# Patient Record
Sex: MALE | Race: WHITE | NOT HISPANIC OR LATINO | Employment: FULL TIME | URBAN - METROPOLITAN AREA
[De-identification: names, ages, dates, MRNs, and addresses within clinical notes are randomized per-mention and may not be internally consistent; named-entity substitution may affect disease eponyms.]

---

## 2021-05-31 ENCOUNTER — RECORDS - HEALTHEAST (OUTPATIENT)
Dept: ADMINISTRATIVE | Facility: CLINIC | Age: 51
End: 2021-05-31

## 2022-06-25 ENCOUNTER — APPOINTMENT (OUTPATIENT)
Dept: CT IMAGING | Facility: HOSPITAL | Age: 52
End: 2022-06-25
Attending: EMERGENCY MEDICINE
Payer: COMMERCIAL

## 2022-06-25 ENCOUNTER — HOSPITAL ENCOUNTER (EMERGENCY)
Facility: HOSPITAL | Age: 52
Discharge: HOME | End: 2022-06-26
Attending: EMERGENCY MEDICINE | Admitting: EMERGENCY MEDICINE
Payer: COMMERCIAL

## 2022-06-25 DIAGNOSIS — N20.1 URETEROLITHIASIS: ICD-10-CM

## 2022-06-25 LAB
ANION GAP SERPL CALCULATED.3IONS-SCNC: 9 MMOL/L (ref 5–18)
BASOPHILS # BLD AUTO: 0.1 10E3/UL (ref 0–0.2)
BASOPHILS NFR BLD AUTO: 1 %
BUN SERPL-MCNC: 16 MG/DL (ref 8–22)
CALCIUM SERPL-MCNC: 8.9 MG/DL (ref 8.5–10.5)
CHLORIDE BLD-SCNC: 107 MMOL/L (ref 98–107)
CO2 SERPL-SCNC: 23 MMOL/L (ref 22–31)
CREAT SERPL-MCNC: 1.28 MG/DL (ref 0.7–1.3)
EOSINOPHIL # BLD AUTO: 0.2 10E3/UL (ref 0–0.7)
EOSINOPHIL NFR BLD AUTO: 3 %
ERYTHROCYTE [DISTWIDTH] IN BLOOD BY AUTOMATED COUNT: 12.7 % (ref 10–15)
GFR SERPL CREATININE-BSD FRML MDRD: 67 ML/MIN/1.73M2
GLUCOSE BLD-MCNC: 135 MG/DL (ref 70–125)
HCT VFR BLD AUTO: 43.1 % (ref 40–53)
HGB BLD-MCNC: 14.1 G/DL (ref 13.3–17.7)
IMM GRANULOCYTES # BLD: 0 10E3/UL
IMM GRANULOCYTES NFR BLD: 1 %
LYMPHOCYTES # BLD AUTO: 1.7 10E3/UL (ref 0.8–5.3)
LYMPHOCYTES NFR BLD AUTO: 24 %
MCH RBC QN AUTO: 29 PG (ref 26.5–33)
MCHC RBC AUTO-ENTMCNC: 32.7 G/DL (ref 31.5–36.5)
MCV RBC AUTO: 89 FL (ref 78–100)
MONOCYTES # BLD AUTO: 0.7 10E3/UL (ref 0–1.3)
MONOCYTES NFR BLD AUTO: 10 %
NEUTROPHILS # BLD AUTO: 4.4 10E3/UL (ref 1.6–8.3)
NEUTROPHILS NFR BLD AUTO: 61 %
NRBC # BLD AUTO: 0 10E3/UL
NRBC BLD AUTO-RTO: 0 /100
PLATELET # BLD AUTO: 260 10E3/UL (ref 150–450)
POTASSIUM BLD-SCNC: 3.9 MMOL/L (ref 3.5–5)
RBC # BLD AUTO: 4.86 10E6/UL (ref 4.4–5.9)
SODIUM SERPL-SCNC: 139 MMOL/L (ref 136–145)
WBC # BLD AUTO: 7 10E3/UL (ref 4–11)

## 2022-06-25 PROCEDURE — 36415 COLL VENOUS BLD VENIPUNCTURE: CPT | Performed by: EMERGENCY MEDICINE

## 2022-06-25 PROCEDURE — 250N000011 HC RX IP 250 OP 636: Performed by: EMERGENCY MEDICINE

## 2022-06-25 PROCEDURE — 96361 HYDRATE IV INFUSION ADD-ON: CPT

## 2022-06-25 PROCEDURE — 80048 BASIC METABOLIC PNL TOTAL CA: CPT | Performed by: EMERGENCY MEDICINE

## 2022-06-25 PROCEDURE — 85025 COMPLETE CBC W/AUTO DIFF WBC: CPT | Performed by: EMERGENCY MEDICINE

## 2022-06-25 PROCEDURE — 258N000003 HC RX IP 258 OP 636: Performed by: EMERGENCY MEDICINE

## 2022-06-25 PROCEDURE — 99285 EMERGENCY DEPT VISIT HI MDM: CPT | Mod: 25

## 2022-06-25 PROCEDURE — 96375 TX/PRO/DX INJ NEW DRUG ADDON: CPT

## 2022-06-25 PROCEDURE — 74176 CT ABD & PELVIS W/O CONTRAST: CPT

## 2022-06-25 PROCEDURE — 96374 THER/PROPH/DIAG INJ IV PUSH: CPT

## 2022-06-25 RX ORDER — KETOROLAC TROMETHAMINE 15 MG/ML
15 INJECTION, SOLUTION INTRAMUSCULAR; INTRAVENOUS ONCE
Status: COMPLETED | OUTPATIENT
Start: 2022-06-25 | End: 2022-06-25

## 2022-06-25 RX ORDER — ONDANSETRON 2 MG/ML
4 INJECTION INTRAMUSCULAR; INTRAVENOUS ONCE
Status: DISCONTINUED | OUTPATIENT
Start: 2022-06-25 | End: 2022-06-26 | Stop reason: HOSPADM

## 2022-06-25 RX ORDER — MORPHINE SULFATE 4 MG/ML
4 INJECTION, SOLUTION INTRAMUSCULAR; INTRAVENOUS ONCE
Status: COMPLETED | OUTPATIENT
Start: 2022-06-25 | End: 2022-06-25

## 2022-06-25 RX ADMIN — SODIUM CHLORIDE 1000 ML: 9 INJECTION, SOLUTION INTRAVENOUS at 23:30

## 2022-06-25 RX ADMIN — MORPHINE SULFATE 4 MG: 4 INJECTION INTRAVENOUS at 23:53

## 2022-06-25 RX ADMIN — KETOROLAC TROMETHAMINE 15 MG: 15 INJECTION, SOLUTION INTRAMUSCULAR; INTRAVENOUS at 23:53

## 2022-06-26 VITALS
HEART RATE: 86 BPM | DIASTOLIC BLOOD PRESSURE: 77 MMHG | SYSTOLIC BLOOD PRESSURE: 125 MMHG | WEIGHT: 175 LBS | OXYGEN SATURATION: 95 % | TEMPERATURE: 97.9 F | RESPIRATION RATE: 20 BRPM

## 2022-06-26 LAB
ALBUMIN UR-MCNC: NEGATIVE MG/DL
APPEARANCE UR: CLEAR
BILIRUB UR QL STRIP: NEGATIVE
COLOR UR AUTO: ABNORMAL
GLUCOSE UR STRIP-MCNC: NEGATIVE MG/DL
HGB UR QL STRIP: ABNORMAL
KETONES UR STRIP-MCNC: NEGATIVE MG/DL
LEUKOCYTE ESTERASE UR QL STRIP: NEGATIVE
MUCOUS THREADS #/AREA URNS LPF: PRESENT /LPF
NITRATE UR QL: NEGATIVE
PH UR STRIP: 6 [PH] (ref 5–7)
RBC URINE: 12 /HPF
SP GR UR STRIP: 1.02 (ref 1–1.03)
UROBILINOGEN UR STRIP-MCNC: <2 MG/DL
WBC URINE: 2 /HPF

## 2022-06-26 PROCEDURE — 81001 URINALYSIS AUTO W/SCOPE: CPT | Performed by: EMERGENCY MEDICINE

## 2022-06-26 RX ORDER — IBUPROFEN 200 MG
400 TABLET ORAL EVERY 6 HOURS
Qty: 56 TABLET | Refills: 0 | Status: SHIPPED | OUTPATIENT
Start: 2022-06-26 | End: 2022-07-03

## 2022-06-26 RX ORDER — OXYCODONE HYDROCHLORIDE 5 MG/1
5 TABLET ORAL EVERY 4 HOURS PRN
Qty: 8 TABLET | Refills: 0 | Status: SHIPPED | OUTPATIENT
Start: 2022-06-26 | End: 2022-06-30

## 2022-06-26 RX ORDER — DIMENHYDRINATE 50 MG
50 TABLET ORAL EVERY 6 HOURS PRN
Qty: 28 TABLET | Refills: 0 | Status: SHIPPED | OUTPATIENT
Start: 2022-06-26 | End: 2022-07-03

## 2022-06-26 RX ORDER — ACETAMINOPHEN 500 MG
1000 TABLET ORAL EVERY 6 HOURS
Qty: 56 TABLET | Refills: 0 | Status: SHIPPED | OUTPATIENT
Start: 2022-06-26 | End: 2022-07-03

## 2022-06-26 NOTE — DISCHARGE INSTRUCTIONS
You were seen today in the [facility] emergency department for abdominal pain and found to have a kidney stone on the left side which is causing your pain.  The rest of your labs today looked stable and reassuring.  Please review the attached instructions.  You can follow-up with the urologist's soon to review any ongoing symptoms and discuss further management of kidney stone pain if persistent.  If you have any other immediate concerns like high fever or intractable vomiting we can reevaluate you at any time in the emergency department.

## 2022-06-26 NOTE — ED PROVIDER NOTES
EMERGENCY DEPARTMENT ENCOUNTER      NAME: Benji Dunn  AGE: 51 year old male  YOB: 1970  MRN: [id number]  EVALUATION DATE & TIME: 2022 10:57 PM    PCP: No primary care provider on file.    ED PROVIDER: Benji Eller M.D.      Chief Complaint   Patient presents with     Abdominal Pain       FINAL IMPRESSION:  1. Ureterolithiasis        ED COURSE & MEDICAL DECISION MAKIN year old male presents to the Emergency Department for evaluation of abdominal pain.  Patient is uncomfortable appearing but vitally stable when he arrives to the emergency department.  CT demonstrates a 3 mm left UVJ stone causing some hydronephrosis.  Urine analysis and labs are without any evidence of infection and patient is afebrile.  His pain was well controlled after 1 dose of morphine and Toradol here.  He will be instructed to follow-up in the kidney stone [place] to review things further with urology.  Patient given prescription for analgesics and information for follow-up.  Return precautions were discussed.  Patient was discharged in good condition.    At the conclusion of the encounter I discussed the results of all of the tests and the disposition. The questions were answered. The patient or family acknowledged understanding and was agreeable with the care plan.       MEDICATIONS GIVEN IN THE EMERGENCY:  Medications   ondansetron (ZOFRAN) injection 4 mg (has no administration in time range)   0.9% sodium chloride BOLUS (1,000 mLs Intravenous New Bag 22 2330)   ketorolac (TORADOL) injection 15 mg (15 mg Intravenous Given 22 2353)   morphine (PF) injection 4 mg (4 mg Intravenous Given 223)       NEW PRESCRIPTIONS STARTED AT TODAY'S ER VISIT  New Prescriptions    ACETAMINOPHEN (TYLENOL) 500 MG TABLET    Take 2 tablets (1,000 mg) by mouth every 6 hours for 7 days    DIMENHYDRINATE (DRAMAMINE) 50 MG TABLET    Take 1 tablet (50 mg) by mouth every 6 hours as needed for other (kidney stone  pain management)    IBUPROFEN (ADVIL/MOTRIN) 200 MG TABLET    Take 2 tablets (400 mg) by mouth every 6 hours for 7 days    OXYCODONE (ROXICODONE) 5 MG TABLET    Take 1 tablet (5 mg) by mouth every 4 hours as needed for severe pain If pain is not improved with acetaminophen and ibuprofen.          =================================================================    HPI    Patient information was obtained from: Patient    Use of : N/A         Benji Dunn is a 51 year old male with no pertinent history who presents to this ED via walk-in for evaluation of abdominal pain.    Patient had sudden onset of sharp severe left lower quadrant abdominal pain starting about an hour prior to arrival in the emergency department.  He does radiate to the left side of his low back.  He has never had similar symptoms.  He denies any fever.  He did have 1 episode of vomiting after the discomfort started.  He denies any diarrhea.  Denies any dysuria or hematuria noted.    REVIEW OF SYSTEMS   All systems reviewed and negative except as noted in HPI.    PAST MEDICAL HISTORY:  History reviewed. No pertinent past medical history.    PAST SURGICAL HISTORY:  History reviewed. No pertinent surgical history.        CURRENT MEDICATIONS:    Current Facility-Administered Medications   Medication     ondansetron (ZOFRAN) injection 4 mg     Current Outpatient Medications   Medication     acetaminophen (TYLENOL) 500 MG tablet     dimenhyDRINATE (DRAMAMINE) 50 MG tablet     ibuprofen (ADVIL/MOTRIN) 200 MG tablet     oxyCODONE (ROXICODONE) 5 MG tablet         ALLERGIES:  No Known Allergies    FAMILY HISTORY:  History reviewed. No pertinent family history.    SOCIAL HISTORY:   Social History     Socioeconomic History     Marital status:        VITALS:  /77   Pulse 86   Temp 97.9  F (36.6  C) (Oral)   Resp 20   Wt 79.4 kg (175 lb)   SpO2 95%     PHYSICAL EXAM    Constitutional: Well-developed middle-age male patient,  laying in bed, uncomfortable but nontoxic-appearing  HENT: Normocephalic, Atraumatic. Neck Supple.  Eyes: EOMI, Conjunctiva normal.  Respiratory: Breathing comfortably on room air. Speaks full sentences easily. Lungs clear to ascultation.  Cardiovascular: Normal heart rate, Regular rhythm. No peripheral edema.  Abdomen: Soft, there is left lower quadrant tenderness and guarding.  Musculoskeletal: Good range of motion in all major joints. No major deformities noted.  Integument: Warm, Dry.  Neurologic: Alert & awake, Normal motor function, Normal sensory function, No focal deficits noted.   Psychiatric: Cooperative. Affect appropriate.     LAB:  All pertinent labs reviewed and interpreted.  Labs Ordered and Resulted from Time of ED Arrival to Time of ED Departure   BASIC METABOLIC PANEL - Abnormal       Result Value    Sodium 139      Potassium 3.9      Chloride 107      Carbon Dioxide (CO2) 23      Anion Gap 9      Urea Nitrogen 16      Creatinine 1.28      Calcium 8.9      Glucose 135 (*)     GFR Estimate 67     CBC WITH PLATELETS AND DIFFERENTIAL    WBC Count 7.0      RBC Count 4.86      Hemoglobin 14.1      Hematocrit 43.1      MCV 89      MCH 29.0      MCHC 32.7      RDW 12.7      Platelet Count 260      % Neutrophils 61      % Lymphocytes 24      % Monocytes 10      % Eosinophils 3      % Basophils 1      % Immature Granulocytes 1      NRBCs per 100 WBC 0      Absolute Neutrophils 4.4      Absolute Lymphocytes 1.7      Absolute Monocytes 0.7      Absolute Eosinophils 0.2      Absolute Basophils 0.1      Absolute Immature Granulocytes 0.0      Absolute NRBCs 0.0     ROUTINE UA WITH MICROSCOPIC REFLEX TO CULTURE       RADIOLOGY:  Reviewed all pertinent imaging. Please see official radiology report.  Abd/pelvis CT no contrast - Stone Protocol   Final Result   IMPRESSION:    1.  3.5 mm x 2 mm elongated calcification seen in the left aspect of the bladder posteriorly at the UVJ with associated mild/moderate ureteral  pyelocaliectasis. There is a 1 mm x 1 mm nonobstructive stone seen in the right kidney.       2.  Unilateral spondylolysis at L5.                 I, Marysol Gibbs, am serving as a scribe to document services personally performed by Dr. Benji Eller, based on my observation and the provider's statements to me. I, Benji Eller MD attest that Marysol Gibbs is acting in a scribe capacity, has observed my performance of the services and has documented them in accordance with my direction.    Benji Eller M.D.  Emergency Medicine  [facility] EMERGENCY DEPARTMENT  [address] [place] [id number] [phone]  Dept: [phone]     Benji Eller MD  06/26/22 0053

## 2022-06-26 NOTE — ED TRIAGE NOTES
Pt c/o LLQ abdominal pain for past two hours.  Pt denies any known injury.  He states he was just watching TV when pain started.  Pt states he vomited d/t pain being so bad and he thinks he might have seen a little blood in the vomit.  Pt took Ibuprofen 600 mg pta.     Triage Assessment     Row Name 06/25/22 1279       Triage Assessment (Adult)    Airway WDL WDL       Respiratory WDL    Respiratory WDL WDL       Skin Circulation/Temperature WDL    Skin Circulation/Temperature WDL WDL       Cardiac WDL    Cardiac WDL WDL       Peripheral/Neurovascular WDL    Peripheral Neurovascular WDL WDL       Cognitive/Neuro/Behavioral WDL    Cognitive/Neuro/Behavioral WDL WDL

## 2022-06-27 ENCOUNTER — PRE VISIT (OUTPATIENT)
Dept: UROLOGY | Facility: CLINIC | Age: 52
End: 2022-06-27

## 2022-06-27 NOTE — TELEPHONE ENCOUNTER
MEDICAL RECORDS REQUEST   [facility]  Urology Clinic  [address] [place] [id number]  PHONE: [phone]  Fax: [phone]        FUTURE VISIT INFORMATION                                                   Benji Dunn, : 1970 scheduled for future visit at [facility]    APPOINTMENT INFORMATION:    Date: 2022    Provider:  Inocencia Henry MD    Reason for Visit/Diagnosis: kidney stones urgent    REFERRAL INFORMATION:    Referring provider:  Benji Eller MD      RECORDS REQUESTED FOR VISIT                                                     NOTES  STATUS/DETAILS   DISCHARGE REPORT from the ER  yes, 2022 -- Benji Eller MD - [facility] ED   MEDICATION LIST  yes   LABS     URINALYSIS (UA)  yes   KIDNEY STONE     CT ABD PELVIS  yes, 2022     PRE-VISIT CHECKLIST      Record collection complete Yes   Appointment appropriately scheduled           (right time/right provider) Yes   Joint diagnostic appointment coordinated correctly          (ensure right order & amount of time) Yes   MyChart activation If no, please explain pending   Questionnaire complete If no, please explain pending

## 2022-06-29 ENCOUNTER — VIRTUAL VISIT (OUTPATIENT)
Dept: UROLOGY | Facility: CLINIC | Age: 52
End: 2022-06-29
Attending: EMERGENCY MEDICINE
Payer: COMMERCIAL

## 2022-06-29 DIAGNOSIS — N20.1 URETEROLITHIASIS: ICD-10-CM

## 2022-06-29 PROCEDURE — 99204 OFFICE O/P NEW MOD 45 MIN: CPT | Mod: 95 | Performed by: UROLOGY

## 2022-06-29 NOTE — LETTER
6/29/2022       RE: Benji Dunn  [address]  [facility] [address]     Dear Colleague,    Thank you for referring your patient, Benji Dunn, to the [facility] UROLOGY CLIN[facility] [place] [facility]. Please see a copy of my visit note below.    Benji is a 51 year old who is being evaluated via a billable video visit.      How would you like to obtain your AVS? MyChart  If the video visit is dropped, the invitation should be resent by: Text to cell phone: [phone]  Will anyone else be joining your video visit? No        Video-Visit Details    Video Start Time: 2:27 PM    Type of service:  Video Visit    Video End Time:2:38 PM    Originating Location (pt. Location): Home    Distant Location (provider location):  [facility] UROLOGY CLINIC [place]     Platform used for Video Visit: [employer]    HPI:  Benji Dunn is a 51 year old male with Left urethrolythiasis. This is his first stone, he feels like he is 100% back to normal.  He was straining his urine and passed it on Sunday.    Reviewed previous notes from Dr. Eller from 6/25/22    Exam:  There were no vitals taken for this visit.  GENERAL: Healthy, alert and no distress  EYES: Eyes grossly normal to inspection.  No discharge or erythema, or obvious scleral/conjunctival abnormalities.  RESP: No audible wheeze, cough, or visible cyanosis.  No visible retractions or increased work of breathing.    SKIN: Visible skin clear. No significant rash, abnormal pigmentation or lesions.  NEURO: Cranial nerves grossly intact.  Mentation and speech appropriate for age.  PSYCH: Mentation appears normal, affect normal/bright, judgement and insight intact, normal speech and appearance well-groomed.    Review of Imaging:  The following imaging exams were independently reviewed and by me and discussed with patient:  CT Scan Abd/Pelvis: 6/25/22  IMPRESSION:   1.  3.5 mm x 2 mm elongated calcification  seen in the left aspect of the bladder posteriorly at the UVJ with associated mild/moderate ureteral pyelocaliectasis. There is a 1 mm x 1 mm nonobstructive stone seen in the right kidney.     Review of Labs:  The following labs were reviewed by me and discussed with the patient:  Lab Results   Component Value Date    WBC 7.0 06/25/2022     Lab Results   Component Value Date    RBC 4.86 06/25/2022     Lab Results   Component Value Date    HGB 14.1 06/25/2022     Lab Results   Component Value Date    HCT 43.1 06/25/2022     Lab Results   Component Value Date    MCV 89 06/25/2022     Lab Results   Component Value Date    MCH 29.0 06/25/2022     Lab Results   Component Value Date    MCHC 32.7 06/25/2022     Lab Results   Component Value Date    RDW 12.7 06/25/2022     Lab Results   Component Value Date     06/25/2022        Last Comprehensive Metabolic Panel:  Sodium   Date Value Ref Range Status   06/25/2022 139 136 - 145 mmol/L Final     Potassium   Date Value Ref Range Status   06/25/2022 3.9 3.5 - 5.0 mmol/L Final     Chloride   Date Value Ref Range Status   06/25/2022 107 98 - 107 mmol/L Final     Carbon Dioxide (CO2)   Date Value Ref Range Status   06/25/2022 23 22 - 31 mmol/L Final     Anion Gap   Date Value Ref Range Status   06/25/2022 9 5 - 18 mmol/L Final     Glucose   Date Value Ref Range Status   06/25/2022 135 (H) 70 - 125 mg/dL Final     Urea Nitrogen   Date Value Ref Range Status   06/25/2022 16 8 - 22 mg/dL Final     Creatinine   Date Value Ref Range Status   06/25/2022 1.28 0.70 - 1.30 mg/dL Final     GFR Estimate   Date Value Ref Range Status   06/25/2022 67 >60 mL/min/1.73m2 Final     Comment:     Effective December 21, 2021 eGFRcr in adults is calculated using the 2021 CKD-EPI creatinine equation which includes age and gender (Fatmata et al., NEJ, DOI: 10.1056/NXEFud6877262)     Calcium   Date Value Ref Range Status   06/25/2022 8.9 8.5 - 10.5 mg/dL Final          Color Urine (no units)    Date Value   06/26/2022 Light Yellow     Appearance Urine (no units)   Date Value   06/26/2022 Clear     Glucose Urine (mg/dL)   Date Value   06/26/2022 Negative     Bilirubin Urine (no units)   Date Value   06/26/2022 Negative     Ketones Urine (mg/dL)   Date Value   06/26/2022 Negative     Specific Gravity Urine (no units)   Date Value   06/26/2022 1.020     pH Urine (no units)   Date Value   06/26/2022 6.0     Protein Albumin Urine (mg/dL)   Date Value   06/26/2022 Negative     Nitrite Urine (no units)   Date Value   06/26/2022 Negative     Leukocyte Esterase Urine (no units)   Date Value   06/26/2022 Negative          Assessment & Plan   52 y/o male with no previous hx of nephrolithiasis.  He had and passed a 3.5 mm stone.  We discussed preventative things to do to prevent stones in general.  We also discussed 24 hour urine and u/s in 6 weeks but given small stone he would just like to observe  -submit stone for stone analysis  -try to stay well hydrated with greater than 2 L of water per day  -limit salt intake  -increase citrate intake  -if stone is Ca Oxalate then limit foods high in oxalate.  -f/u prn    Inocencia Henry MD  [facility] UROLOGY CLINIC [place]      ==========================      Additional Coding Information:    Time spent:  48 minutes spent on the date of the encounter doing chart review, history and exam, documentation and further activities per the note

## 2022-06-29 NOTE — PROGRESS NOTES
HPI:  Benji Dunn is a 51 year old male with Left urethrolythiasis. This is his first stone, he feels like he is 100% back to normal.  He was straining his urine and passed it on Sunday.    Reviewed previous notes from Dr. Eller from 6/25/22    Exam:  There were no vitals taken for this visit.  GENERAL: Healthy, alert and no distress  EYES: Eyes grossly normal to inspection.  No discharge or erythema, or obvious scleral/conjunctival abnormalities.  RESP: No audible wheeze, cough, or visible cyanosis.  No visible retractions or increased work of breathing.    SKIN: Visible skin clear. No significant rash, abnormal pigmentation or lesions.  NEURO: Cranial nerves grossly intact.  Mentation and speech appropriate for age.  PSYCH: Mentation appears normal, affect normal/bright, judgement and insight intact, normal speech and appearance well-groomed.    Review of Imaging:  The following imaging exams were independently reviewed and by me and discussed with patient:  CT Scan Abd/Pelvis: 6/25/22  IMPRESSION:   1.  3.5 mm x 2 mm elongated calcification seen in the left aspect of the bladder posteriorly at the UVJ with associated mild/moderate ureteral pyelocaliectasis. There is a 1 mm x 1 mm nonobstructive stone seen in the right kidney.     Review of Labs:  The following labs were reviewed by me and discussed with the patient:  Lab Results   Component Value Date    WBC 7.0 06/25/2022     Lab Results   Component Value Date    RBC 4.86 06/25/2022     Lab Results   Component Value Date    HGB 14.1 06/25/2022     Lab Results   Component Value Date    HCT 43.1 06/25/2022     Lab Results   Component Value Date    MCV 89 06/25/2022     Lab Results   Component Value Date    MCH 29.0 06/25/2022     Lab Results   Component Value Date    MCHC 32.7 06/25/2022     Lab Results   Component Value Date    RDW 12.7 06/25/2022     Lab Results   Component Value Date     06/25/2022        Last Comprehensive Metabolic  Panel:  Sodium   Date Value Ref Range Status   06/25/2022 139 136 - 145 mmol/L Final     Potassium   Date Value Ref Range Status   06/25/2022 3.9 3.5 - 5.0 mmol/L Final     Chloride   Date Value Ref Range Status   06/25/2022 107 98 - 107 mmol/L Final     Carbon Dioxide (CO2)   Date Value Ref Range Status   06/25/2022 23 22 - 31 mmol/L Final     Anion Gap   Date Value Ref Range Status   06/25/2022 9 5 - 18 mmol/L Final     Glucose   Date Value Ref Range Status   06/25/2022 135 (H) 70 - 125 mg/dL Final     Urea Nitrogen   Date Value Ref Range Status   06/25/2022 16 8 - 22 mg/dL Final     Creatinine   Date Value Ref Range Status   06/25/2022 1.28 0.70 - 1.30 mg/dL Final     GFR Estimate   Date Value Ref Range Status   06/25/2022 67 >60 mL/min/1.73m2 Final     Comment:     Effective December 21, 2021 eGFRcr in adults is calculated using the 2021 CKD-EPI creatinine equation which includes age and gender (Fatmata et al., NEJ, DOI: 10.1056/TITXwp4537328)     Calcium   Date Value Ref Range Status   06/25/2022 8.9 8.5 - 10.5 mg/dL Final          Color Urine (no units)   Date Value   06/26/2022 Light Yellow     Appearance Urine (no units)   Date Value   06/26/2022 Clear     Glucose Urine (mg/dL)   Date Value   06/26/2022 Negative     Bilirubin Urine (no units)   Date Value   06/26/2022 Negative     Ketones Urine (mg/dL)   Date Value   06/26/2022 Negative     Specific Gravity Urine (no units)   Date Value   06/26/2022 1.020     pH Urine (no units)   Date Value   06/26/2022 6.0     Protein Albumin Urine (mg/dL)   Date Value   06/26/2022 Negative     Nitrite Urine (no units)   Date Value   06/26/2022 Negative     Leukocyte Esterase Urine (no units)   Date Value   06/26/2022 Negative          Assessment & Plan   52 y/o male with no previous hx of nephrolithiasis.  He had and passed a 3.5 mm stone.  We discussed preventative things to do to prevent stones in general.  We also discussed 24 hour urine and u/s in 6 weeks but given  small stone he would just like to observe  -submit stone for stone analysis  -try to stay well hydrated with greater than 2 L of water per day  -limit salt intake  -increase citrate intake  -if stone is Ca Oxalate then limit foods high in oxalate.  -f/u prn    Inocencia Henry MD  [facility] UROLOGY CLINIC [place]      ==========================      Additional Coding Information:    Time spent:  48 minutes spent on the date of the encounter doing chart review, history and exam, documentation and further activities per the note

## 2022-06-29 NOTE — PROGRESS NOTES
eBnji is a 51 year old who is being evaluated via a billable video visit.      How would you like to obtain your AVS? [facility]  If the video visit is dropped, the invitation should be resent by: Text to cell phone: [phone]  Will anyone else be joining your video visit? No        Video-Visit Details    Video Start Time: 2:27 PM    Type of service:  Video Visit    Video End Time:2:38 PM    Originating Location (pt. Location): Home    Distant Location (provider location):  [facility] UROLOGY [facility]     Platform used for Video Visit: [employer]

## 2022-06-29 NOTE — PATIENT INSTRUCTIONS
-submit stone for stone analysis  -try to stay well hydrated with greater than 2 L of water per day  -limit salt intake  -increase citrate intake  -if stone is Ca Oxalate then limit foods high in oxalate.  -f/u prn